# Patient Record
Sex: FEMALE | Race: WHITE
[De-identification: names, ages, dates, MRNs, and addresses within clinical notes are randomized per-mention and may not be internally consistent; named-entity substitution may affect disease eponyms.]

---

## 2017-03-28 NOTE — EPPROC
Electrophysiology Procedure Note: 


ELECTROPHYSIOLOGIC STUDY AND   CATHETER MEDIATED ABLATION OF SLOW/FAST AV NOE 

REENTRY TACHYCARDIA





PROCEDURES PERFORMED:





14148-33    EP evaluation with RA/RV/LA pace/record, with arrhythmia induction





82950-49    EP evaluation with RA/RV pace record, insert/reposition catheter, 

with arrhythmia induction





52546      Intracardiac catheter ablation, SVT arrhythmogenic focus 





49736      3D mapping





Fluoroscopy








INDICATION: SVT on LINQ








PROCEDURE:





Catheters & Anesthesia: 





The patient arrived in the Electrophysiology Laboratory in the fasting state.  

The right clavicular region, right groin, and left groin area were prepped and 

draped in the usual sterile manner.  Anesthesiologist Dr. BRANDIE Perry administered 

general anesthesia.   Appropriate non-invasive blood pressure, pulse oximetry 

and end-tidal CO2 monitoring was established.





All catheters were placed percutaneously using the modified Seldinger technique

, and advanced into position under fluoroscopic guidance. One #6 Guatemalan 

hexapolar non-deflectable electrode catheter was inserted into the right atrial 

appendage via the left femoral vein (2mm spacing; except the proximal ring 

which was 25cm from the tip  used for unipolar recordings).  One #7 Guatemalan 

deflectable octapolar electrode catheter was advanced to the His-bundle 

position via the left femoral vein (2mm spacing).   One #7 Guatemalan deflectable 

catheter with 10 pairs of electrodes was placed via the right femoral vein into 

the coronary sinus. CS access needed to be obtained with quadrapolar catheter 

first.


Heparin was given to keep ACT > 200 s.





Programmed stimulation was performed from the right atrium, right ventricle and 

coronary sinus (left atrium).   Parahisian pacing demonstrated constant H-A 

interval with changing V-A intervals and stimulus-A intervals during capture 

and loss of capture of proximal RBB proving retrograde conduction over AV node.

  Dual AV noe pathway physiology was seen.  Up to 3 bts of AVNRT were induced

, sustained AVNRT could not be induced despite programmed stimulation on 

isoproterenol up to 4 mcg/min.  Sinus tachycardia 380 ms was seen with 

isoproterenol.





Given that patient has had sustained SVT, we decided to ablate the slow AV 

noe pathway.





Mapping of the right atrium and coronary sinus during AVNRT identified earliest 

atrial activation above the tendon of Micah at a level slightly posterior to 

the level of the His bundle, consistent with retrograde conduction over the 

fast AV neo pathway.    





A #8 Guatemalan deflectable quadrapolar electrode catheter (2mm-5mm-2mm spacing) 

with 4 mm tip electrode and sensor for the 3D mapping Carto system was advanced 

to the right atrium. 3 D mapping of the inter-atrial septum and coronary sinus 

was performed and location of the AV node was marked.





 A SL2 sheath was used.  RF applications were delivered to the region between 

the tricuspid annulus   and the coronary sinus ostium, at the level of the 

upper edge of the coronary sinus ostium. Radiofrequency applications were also 

delivered along the roof of the proximal coronary sinus.    Junctional rhythm 

occurred during all of the RF applications.





Antegrade slow pathway conduction was not seen post ablation.





The catheters were removed.  The long sheath was changed to a short 9 Fr 

sheath. The patient was transferred to the cardiovascular holding area in 

stable condition.  Vascular access sheaths will be removed in the holding area.

  There were no apparent complications.





Results:





Spontaneous Intervals:


Pre ablation     ms AH 60 ms HV 40 ms


Post ablation   ms AH 60 ms HV 40 ms





Antegrade AV noe function (decremental pacing)


Pre ablation    FPERP 460 ms SPERP 450 ms WBB  ms


Post ablation    FPERP 360 ms   WBB  ms





Retrograde AV noe function (decremental pacing)


Pre ablation    FPERP 290 ms   WBB  ms 





 Arrhythmias:


Non-Sustained slow/fast AVNRT, 3 bts


 








CONCLUSIONS





1.Prior documented sustained SVT


2.Successful ablation of the slow AV noe pathway with elimination of 1:1 

antegrade conduction over the slow AV noe pathway and all retrograde 

conduction over the slow AV noe pathway. 


3.No complications.








Patient Problems: 


 Problems











Problem Status Onset


 


Supraventricular tachycardia Acute

## 2017-03-28 NOTE — CPEKG
Heart Rate: 77

RR Interval: 779

P-R Interval: 144

QRSD Interval: 82

QT Interval: 392

QTC Interval: 444

P Axis: 77

QRS Axis: 54

T Wave Axis: 56

EKG Severity - OTHERWISE NORMAL ECG -

EKG Impression: SINUS RHYTHM

EKG Impression: Possible left atrial abnormality

EKG Impression: No significant change from March 28, 2017, 7:28

Electronically Signed By: Kwabena Clements 28-Mar-2017 18:32:44

## 2017-03-28 NOTE — CPEKG
Heart Rate: 67

RR Interval: 896

P-R Interval: 144

QRSD Interval: 94

QT Interval: 412

QTC Interval: 435

P Axis: 76

QRS Axis: 61

T Wave Axis: 60

EKG Severity - NORMAL ECG -

EKG Impression: SINUS RHYTHM

EKG Impression: Agree with above

Electronically Signed By: Kwabena Clements 28-Mar-2017 18:33:38

## 2017-03-29 NOTE — CPEKG
Heart Rate: 84

RR Interval: 714

P-R Interval: 132

QRSD Interval: 86

QT Interval: 376

QTC Interval: 445

P Axis: 77

QRS Axis: 58

T Wave Axis: 52

EKG Severity - NORMAL ECG -

EKG Impression: SINUS RHYTHM

EKG Impression: No significant change from March 28, 2017

Electronically Signed By: Kwabena Clements 29-Mar-2017 12:27:21

## 2017-03-29 NOTE — PDCARPN
Cardiology Progress Note


Chief Complaint: 





Patient reports chest pressure with deep inspiration.  Also reporting continues 

left hip pain.


Assessment/Plan: 


Assessment:


46-year-old female patient with significant history of factor 5 Leiden mutation 

and SVT.  Noted February 11th off continues monitoring of an episode SVT with 

heart rates up to 207 BPM.  Referred to Dr. Baltazar for EP study.  Performed 

yesterday, which she was successfully able to eliminate a slow AV belkis pathway 

with retrograde conduction (AVNRT).  No apparent complications.  This morning, 

patient reporting chest pressure with deep inspirations, reporting has been 

going on for greater than 1 week.  And left hip pain.  Groin site without signs 

of bleeding, ecchymosis, hematoma, or signs of infection.  Electrocardiogram 

done this a.m. shows sinus rhythm with no significant ST or T-wave 

abnormalities suggesting of ischemia.  Unchanged from preprocedural EKG.  

Patient noted to have normal exercise treadmill test done at [facility] 

7/8/2016.  Echocardiogram done this morning showed normal LV function with EF 66

%, no wall motion abnormalities, trace MR, no pericardial effusion. Patient 

with mildly elevated troponin this morning at 0.211, which is expected after 

ablation.  No CK-MB elevation.  Patient sent for CTA of chest, negative for PE, 

trace right pleural effusion.  Noted 4 mm nodule, which radiology reports no 

further follow-up is recommended due the patient being a nonsmoker.  Patient 

given ibuprofen, reporting some relief in symptoms.  Toradol this afternoon.  

Continuous cardiac monitoring shows sinus rhythm, no SVT, VT, or malignant 

arrhythmias could.  No significant pauses.





Plan:


1. SVT:  Status post ablation, no arrhythmias noted on continuous cardiac 

monitoring.





2.  CP:  Improved with ibuprofen, patient given torsemide this afternoon.  

Electrocardiogram showing normal LV systolic function, normal EF, no wall 

motion abnormalities.  No pericardial effusion.  No acute ST or T-wave 

abnormalities.  Mild troponin bump as expected post ablation, no CK elevation.  

Negative CTA for PE.  Will plan on monitoring overnight, repeat EKG in a.m..  

Repeat troponin level to assure on downward decline.  Will also set order C 

reactive protein and sed rate in am.





3. Factor 5 Leiden deficiency:  Patient had Lovenox postoperatively, transition 

to Eliquis, per Dr. Baltazar once her on a for a minimum of 1 month post ablation 





4.  4 mm nodule on CTA chest:  Patient with previous history of smoking, quit 

25 years ago, will refer to pulmonology as an outpatient for follow-up.





5.  Left hip pain:  No signs of bleeding (H&H stable), improvement with 

ibuprofen.  Potential muscular skeletal due to duration laying on  EP table.





Due to the patient needing to be further monitor, will plan for her for least 1 

more midnight stay.

















03/29/17 16:19





Subjective: 





Patient denies of any shortness of breath, orthopnea, PND, edema, palpitations, 

near-syncope, or syncopal events.


Reviewed/Discussed With: family (), other (Dr Baltazar)


Objective: 





 Vital Signs (8 Hrs)











  Temp Pulse Resp BP Pulse Ox


 


 03/29/17 11:52  36.6 C  66  17  101/69  99








 Intake/Output (24 Hrs)











 03/28/17 03/29/17 03/30/17





 05:59 05:59 05:59


 


Intake Total  3100 


 


Output Total  850 


 


Balance  2250 


 


Intake:   


 


  Oral (ml)  1250 


 


  IV Intake (ml)  1850 


 


Output:   


 


  Urine (ml)  850 


 


    Toilet  850 


 


Other:   


 


  Weight  58 kg 


 


  Intake Quantity  Yes 





  Sufficient   


 


  Number of Voids   


 


    Toilet  1 











Result Diagrams: 


 03/29/17 03:37





 03/29/17 03:37


Cardiac Labs: 





 Cardiac Lab Results (72 Hrs)











  03/29/17





  03:37


 


CK-MB (CK-2) Fraction  1.27


 


Troponin I  0.211 H














- Physical Exam


Constitutional: WDWN, healthy appearing, no apparent distress


Ears, Nose, Mouth, Throat: moist mucous membranes


Cardiovascular: regular rate and rhythm, no murmurs, no rubs, no gallops, 

pulses symmetric bilat, No systolic murmur, No jugular vein distention, No 

carotid bruit


Peripheral Pulses: 1+: dorsalis-pedis (R), dorsalis-pedis (L), 2+: carotid (R), 

carotid (L)


Respiratory: clear to auscultate bilat, no crackles, no wheezes, No reduced air 

movement


Gastrointestinal: normoactive bowel sounds


Skin: no rashes, warm, no edema, other (Bilateral groin sites, catheter 

insertion sites, with no redness, swelling, or drainage.  No ecchymosis, 

hematoma, or bleeding.  No auscultated bruit over either site.)


Neurologic: AAOx3


Psychiatric: cooperative, interactive, following commands





ICD10 Worksheet


Patient Problems: 


 Problems











Problem Status Onset


 


Supraventricular tachycardia Acute

## 2017-03-29 NOTE — ECHO
[id number]

[id number]



+---------------------------------------------------+      [address]

:                                                   :       [place] [address]

:                                                   :           [phone]

+---------------------------------------------------+       Fax [phone]



                       Adult Echocardiographic Report



+----------------------------------------------------------------------------

---+

:Name: CARLEE NAVARRO LStudy Date: 2017 09:49 AM                          

   :

:MRN: [id number]    Hospital Admission Number: [id number] Location: 

222:

:: 1970    Gender: Female                         Height: 65 in     

   :

:Age: 46 yrs        Race: WH                               Weight: 127 lb    

   :

:Reason For Study: Eval LV Fx                                                

   :

:                                                          BSA: 1.6 meters2  

   :

:History: Post Ablation                                                      

   :

+----------------------------------------------------------------------------

---+

MMode/2D Measurements \T\ Calculations

IVSd: 0.77 cm   LVIDd: 4.3 cm  FS: 35.5 %            Ao root diam: 2.5 cm

LVPWd: 0.94 cm  LVIDs: 2.8 cm  EDV(Teich): 83.0 ml   ACS: 1.7 cm

                               ESV(Teich): 28.8 ml   LA dimension: 2.3 cm

                               EF(Teich): 65.3 %



Normal Measurement Values:

+----------------------------------------------------------------------------

----------------------------------+

:LVIDd (3.5-5.7cm)    IVSd (0.6-1.1cm)     LVPWd (0.6-1.1cm)     Aortic Root 

(2.0-3.7cm)Left Atrium (1.5-4.0cm):

:LV Vol(d) (76-115ml) LV Vol(s) (29-48ml)  Ejec Fraction (50-65%)PV Gomez (0.6-

1.2m/s)    TV Gomez (0.4-1.0m/s)    :

:MV E Gomez (0.8-1.0m/s)MV A Gomez (0.3-1.0m/s)LVOT Gomez (0.7-1.2m/s) Asc Ao Gomez (

0.9-1.8m/s)                       :

+----------------------------------------------------------------------------

----------------------------------+

Doppler Measurements \T\ Calculations

MV E max gomez:       Ao V2 max:          LV V1 max:        PA V2 max:

83.4 cm/sec         139.8 cm/sec        81.9 cm/sec       81.4 cm/sec

MV A max gomez:       Ao max P.8 mmHg LV V1 max PG:     PA max P.8 cm/sec                             2.7 mmHg          2.7 mmHg

MV E/A: 1.5



Left Ventricle

The left ventricle is normal in size. There is normal left ventricular wall

thickness. The left ventricular ejection fraction is normal. Ejection

Fraction = 66%. The left ventricular wall motion is normal.







Atria

The left atrial size is normal. Right atrial size is normal.





Mitral Valve

The mitral valve is normal in structure and function. There is no evidence

of mitral valve prolapse. There is no mitral valve stenosis. There is trace

mitral regurgitation.



Tricuspid Valve

Normal tricuspid valve. No tricuspid regurgitation.



Aortic Valve

The aortic valve is normal in structure and function. The aortic valve is

trileaflet. There is no aortic stenosis. There is no aortic insufficiency.



Pulmonic Valve

The pulmonic valve is normal in structure and function. There is no pulmonic

valvular regurgitation.



Great Vessels

The aortic root is normal size.





Pericardium/Pleural

There is no pericardial effusion.



Conclusion

A complete two-dimensional transthoracic echocardiogram was performed (2D,

M-mode, Doppler and color flow Doppler).

The left ventricular ejection fraction is normal.

Ejection Fraction = 66%.

The left ventricular wall motion is normal.

The left atrial size is normal.

The mitral valve is normal in structure and function.

There is trace mitral regurgitation.

The aortic valve is normal in structure and function.

The aortic valve is trileaflet.

There is no pericardial effusion.



_____________________________________________________________________________







Final Reading Physician:

                        Greta Blancas signed on 2017 10:40 AM

Ordering Physician: Mendoza Fontenot

Performed By: Anthony Brennan, EVERETTCS

## 2017-03-30 NOTE — GDS
[f rep st]



                                                             DISCHARGE SUMMARY





DISCHARGE DIAGNOSES:  

1.  Paroxysmal supraventricular tachycardia confirmed by LINQ monitor placement.

2.  History of factor V Leiden mutation.

3.  History of transient ischemic attacks to vertebral artery dissection.

4.  4 mm right lower lobe nodule.

5.  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



PROCEDURES:  03/28/2017, EP study with successful ablation of the AV belkis pathway with elimination 
of one-to-one antegrade conduction over the slow AV belkis pathway and all retrograde conduction over
 the slow AV belkis pathway.



HISTORY:  Please see dictated H and P from Dr. Baltazar for complete details.  In brief, the patient is a
 46-year-old female with a history of SVT, vertebral artery dissection with subsequent TIAs, factor 
V Leiden mutation, who was referred by Dr. Roland Villa for SVT found on LINQ monitoring.  She proceed
ed to EP study with ablation with Dr. Baltazar.  Yesterday was postprocedure day #1.  Patient was describ
ing about a week's history of shortness of breath and chest pain on deep inspiration.  Echo showed n
o pericardial effusion.  EKG was without evidence of any ST changes.  CTA was obtained and was negat
kristie for PE.  She does have a small 4 mm nodule, and she was advised to follow up with pulmonology in
 regard to this.



HOSPITAL COURSE BY PROBLEM:  

1.  SVT, status post ablation.  Due to her factor V Leiden mutation, she has been started on Eliquis
, which will need to be continued for a month total.  She has been given a prescription as well as a
 free sample card.

2.  Chest pain.  She reports it was improved with Ativan.  She is requesting a short prescription of
 this and this was given to her.

3.  Factor V Leiden mutation.  As above, she is being started on Eliquis postprocedure.  After 1 mon
th, she is to transition back to her oral aspirin.

4.  4 mm lung nodule.  She may see pulmonology as an outpatient for this.

5.  Left hip pain, likely musculoskeletal.  She was advised on analgesia for this.



RESULTS PENDING:  None.



PHYSICAL EXAM:  VITAL SIGNS:  On day of discharge, blood pressure 104/62, heart rate 76, respiration
s 16, O2 saturation 98% on room air, temp of 97.4.  GENERAL:  She is a very pleasant female in no ap
parent distress.  HEENT:  Eyes are without scleral icterus.  HEART:  Regular rate and rhythm.  LUNGS
:  Clear.  ABDOMEN:  Groin site without bruit.  EXTREMITIES:  She has 2+ PT and DP pulses bilaterall
y.



LABORATORY DATA:  CBC on day of discharge, with a WBC of 5.87, hemoglobin 12.5, hematocrit 36.8, josh
telet count of 204.  BMP:  Sodium 138, potassium 4.2, chloride 109, CO2 21, BUN 10, creatinine 0.7, 
glucose 93.



DIET:  Per previous.



ACTIVITY:  Groin precautions were reviewed.



FOLLOWUP:  

1.  Follow up with Dr. Baltazar as scheduled.

2.  Consult pulmonology as an outpatient.

3.  Follow up with PCP as scheduled. 



Please note that greater than 30 minutes was spent on discharge and coordination of care.





Job #:  [id number]

## 2017-03-30 NOTE — CPEKG
Heart Rate: 80

RR Interval: 750

P-R Interval: 132

QRSD Interval: 80

QT Interval: 380

QTC Interval: 439

P Axis: 73

QRS Axis: 56

T Wave Axis: 54

EKG Severity - NORMAL ECG -

EKG Impression: SINUS RHYTHM

EKG Impression: No significant change from March 29, 2017

Electronically Signed By: Kwabena Clements 30-Mar-2017 15:28:32

## 2017-12-06 ENCOUNTER — HOSPITAL ENCOUNTER (OUTPATIENT)
Dept: HOSPITAL 80 - FCATH | Age: 47
Discharge: HOME | End: 2017-12-06
Attending: INTERNAL MEDICINE
Payer: COMMERCIAL

## 2017-12-06 DIAGNOSIS — Z45.09: Primary | ICD-10-CM

## 2017-12-06 PROCEDURE — 0JPT02Z REMOVAL OF MONITORING DEVICE FROM TRUNK SUBCUTANEOUS TISSUE AND FASCIA, OPEN APPROACH: ICD-10-PCS | Performed by: INTERNAL MEDICINE

## 2017-12-06 NOTE — PDGENHP
History & Physical


Chief Complaint: Linq removal


History of Present Illness: 47 year old female with sVT sp ablatiion requesting 

LINQ removal.


Relevant Physical Exam: Awake, Alert appriopriate

## 2017-12-06 NOTE — GPN
[f rep st]



                                                                 PROCEDURE NOTE





DATE OF PROCEDURE:  12/06/2017



PROCEDURE PERFORMED:  [employer] implantable loop recorder removal.



INDICATION:  Initial indication for loop recorder was palpitations and supraventricular tachycardia. 




The patient was found to have a supraventricular tachycardia with an implantable loop recorder and un
derwent successful ablation with Dr. Jair Baltazar.  She is requesting implantable loop recorder removal
 in anticipation of breast augmentation surgery scheduled for sometime next year.



DESCRIPTION OF PROCEDURE:  After informed consent was obtained, the patient was brought to the [facility] catheterization lab where she was prepped and draped in a sterile fashion.  She was given sedation 
with Versed and fentanyl.  Once appropriate level of sedation was achieved, 1% lidocaine was used to 
anesthetize the surgical site locally.  Incision was made with a scalpel blade, and device was remove
d successfully with forceps.  Hemostasis was achieved.  Two staples were applied.  Sterile dressing a
nd Tegaderm were applied.  Patient tolerated the procedure well.  There were no postoperative complic
ations.



CONCLUSION:  Successful implantable loop recorder removal.





Job #:  [id number]

## 2017-12-06 NOTE — PDPROPOC
Sedation Plan of Care


Sedation Plan of Care: vital signs stable, mental status noted, patient 

educated of risks, benefits, alternatives, patient can tolerate sedation


ASA Classification: ASA 2


Planned drugs: fentanyl, midazolam


Mallampati Score: Class 1


Mallampati Reference Image: 





Patient passed 3-3-2 rule?: Yes

## 2018-05-01 ENCOUNTER — HOSPITAL ENCOUNTER (EMERGENCY)
Dept: HOSPITAL 80 - FED | Age: 48
Discharge: HOME | End: 2018-05-01
Payer: COMMERCIAL

## 2018-05-01 VITALS — SYSTOLIC BLOOD PRESSURE: 114 MMHG | DIASTOLIC BLOOD PRESSURE: 74 MMHG

## 2018-05-01 DIAGNOSIS — G43.809: Primary | ICD-10-CM

## 2018-05-01 DIAGNOSIS — Z87.891: ICD-10-CM

## 2018-05-01 LAB
INR PPP: 1.13 (ref 0.83–1.16)
PLATELET # BLD: 196 10^3/UL (ref 150–400)
PROTHROMBIN TIME: 14.7 SEC (ref 12–15)

## 2018-05-01 NOTE — CPEKG
Heart Rate: 82

RR Interval: 732

P-R Interval: 140

QRSD Interval: 78

QT Interval: 384

QTC Interval: 449

P Axis: 73

QRS Axis: 45

T Wave Axis: 40

EKG Severity - NORMAL ECG -

EKG Impression: SINUS RHYTHM

Electronically Signed By: Dominique Lindsay 01-May-2018 15:25:14

## 2018-05-01 NOTE — EDPHY
H & P


Stated Complaint: dizziness started 1700 yesterday, R sided h/a-feels similar 

to vert art dis


Source: Patient, Old records


Exam Limitations: No limitations





- Personal History


LMP (Females 10-55): Hysterectomy





- Medical/Surgical History


Hx Asthma: No


Hx Chronic Respiratory Disease: No


Hx Diabetes: No


Hx Cardiac Disease: No


Hx Renal Disease: No


Hx Cirrhosis: No


Hx Alcoholism: No


Hx HIV/AIDS: No


Hx Splenectomy or Spleen Trauma: No


Other PMH: migraines, "reece" stroke 2014, SVT, hysterxctomy 2012.  verterbral 

artery disection 10/13





- Social History


Smoking Status: Former smoker


Time Seen by Provider: 05/01/18 10:59


HPI/ROS: 


HPI:  This is a 47-year-old female who presents with 





Chief Complaint: dizziness started 1700 yesterday, R sided h/a-feels similar to 

vert art dis





Location:  Right-sided head


Quality:  Achiness and dizziness


Duration:  Acute onset around 5:00 p.m.


Signs and Symptoms: no fever, no nausea, no vomiting, no photophobia, no noise 

sensitivity, no neck stiffness, no ear pain, no tinnitus, no nasal congestion, 

no sinus pressure, no weakness, no radiation, no aura, no rash


Timing:  Acute


Severity:  Improving


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 

still taking aspirin, presents with sudden onset of dizziness described as the 

room spinning and a right temple and right side of head discomfort rated as 5/

10 while at work sitting working on the computer yesterday evening around 5:00 

p.m. Patient reports that she did and upper body workout several days prior and 

had some right triceps tenderness and decreased range of motion.  She reports 

status post her vertebral artery dissection she has always had some dull aching 

discomfort in the right trapezius muscle.  She went to [place] within the 

last 1-2 weeks.  She denies any fever/nasal congestion.  She does have a 

history of migraine headaches.  She reports that her boyfriend came to pick her 

up from work and the headache improved to putting cold packs on the back of her 

neck.  She called her neurologist this morning Dr. Gil, who advised her to go 

to the emergency room immediately to rule out vertebral artery dissection.  

Patient reports that she is hoping this is just muscular strain and migraine 

headache type symptoms.  Patient rates her headache and discomfort as 2/10 

presently and politely declines any medication. 


Modifying Factors:  Aspirin





Comment: 








ROS: see HPI


Constitutional: No fever, no chills, no weight loss


Eyes:  No blurred vision


Respiratory:  No shortness of breath, no cough


Cardiovascular:  No chest pain, no palpitations


Gastrointestinal:  No nausea, no vomiting, no diarrhea, no hematemesis, no 

blood in stool 


Genitourinary:  No dysuria, no blood in urine 


Extremities:  No myalgias, no edema


Neurologic:  No weakness, no numbness


Skin:  No rashes, no petechiae


Hematologic:  No bruising, no bleeding








MEDICAL/SURGICAL/SOCIAL HISTORY: 


Medical.Surgery history:  migraines, "reece" stroke 2014, SVT, hysterectomy 2012


vertebral artery dissection 10/13


Social history:  Employed.  Works on the computer. Family history 

noncontributory.








CONSTITUTIONAL:  Extremely polite and cooperative middle-aged white female, 

awake and alert, no obvious distress


HEENT: Atraumatic and normocephalic, PERRL, EOMI.  Nares patent; no rhinorrhea;

  no nasal mucosal edema. Tympanic membranes clear. Oropharynx clear, no 

exudate and moist pink mucosa.  Airway patent.  No lymphadenopathy.  No 

meningismus.  No carotid bruits.  No temporal reproducible tenderness.


Cardiovascular: Normal S1/S2, regular rate, regular rhythm, without murmur rub 

or gallop.


PULMONARY/CHEST:  Symmetrical and nontender. Clear to auscultation bilaterally. 

Good air movement. No accessory muscle usage.


ABDOMEN:  Soft, nondistended, nontender, no rebound, no guarding, no peritoneal 

signs, no masses or organomegaly. No CVAT.


EXTREMITIES:  2/2 pulses, strength 5/5, no deformities, no clubbing, no 

cyanosis or edema.


NEUROLOGICAL: no focal neuro deficits.  GCS 15. Cranial nerves 2-12 grossly 

intact. 


SKIN: Warm and dry, no erythema. no rash.  Good capillary refill. 


 


 (Mandy Gonzalez)


Constitutional: 


 Initial Vital Signs











Temperature (C)  36.4 C   05/01/18 10:34


 


Heart Rate  79   05/01/18 10:34


 


Respiratory Rate  16   05/01/18 10:34


 


Blood Pressure  105/69   05/01/18 10:34


 


O2 Sat (%)  97   05/01/18 10:34








 











O2 Delivery Mode               Room Air














Allergies/Adverse Reactions: 


 





topiramate [From Topamax] Allergy (Verified 03/28/17 08:54)


 Hives








Home Medications: 














 Medication  Instructions  Recorded


 


Gabapentin  05/01/18


 


Meclizine HCl [Meclizine HCl 25 mg 25 mg PO Q6 #12 tab 05/01/18





(RX,OTC)]  














Medical Decision Making





- Diagnostics


EKG Interpretation: 





12 lead EKG is interpreted in Trace master View by emergency department 

physician. (Dominique Lindsay)


ED Course/Re-evaluation: 


EKG, CT head, CTA head and neck, labs ordered


Vital signs stable upon arrival.  No systemic signs.


EKG shows sinus rhythm with a rate of 84 beats per minute


1150:  Reviewed labs.  No signs of leukocytosis/anemia/FRANCESCO/electrolyte imbalance

/coagulopathy. 


Chart review shows that CT angio neck on 04/11/2017:  No change in focal 

dissection involving 5 to set van mm length of the right vertebral artery at 

the level C6. 


1225:  Called by radiologist who advised that head CT scan, CTA head and neck 

shows stable 5 mm vertebral artery dissection.  No other acute processes. 


1226:  Reassessed patient who reports that room spinning increases with any 

movement.  Given IV Decadron and p.o. Meclizine 25 mg.


1305:  Reassessed patient who reports 50% improvement in symptoms.  Able to sit 

up and stand without excessive dizziness. 


1310:  Spoke with neurologist, Dr. Gil, regarding stable CT and CTA finding.  

She is requesting MRI of her brain to evaluate for cerebellar embolism prior to 

discharge. 


1530:  Called by radiologist who advised that MRI brain is normal in shows no 

acute intracranial processes. 


Reassessed patient who reports that she feels back to baseline.  Denies any 

headache/dizziness.  She reports that she just feels tired.  She will follow up 

with both her neurologist and primary care provider.  Requested a prescription 

for Antivert. 





No signs of CVA/temporal arteritis/cerebellar embolism/meningitis








This patient was seen under the supervision of my secondary supervising 

physician.  I evaluated care for this patient independently.  Discussed this 

patient with Dr. Lindsay who did not see the patient.  








 (Mandy Gonzalez)





The patient was evaluated and managed by the physician assistant.  I have 

reviewed this chart and I agree with the findings and plan of care as documented

, as indicated by my signature.  I am the secondary supervising physician. (

Dominique Lindsay)


Differential Diagnosis: 


Dizziness including but not limited to peripheral and central causes of vertigo

, orthostatic causes including dehydration, and blood loss.


 (Mandy Gonzalez)





- Data Points


Laboratory Results: 


 Laboratory Results





 05/01/18 11:15 





 05/01/18 11:15 








Medications Given: 


 








Discontinued Medications





Dexamethasone (Decadron Injection)  8 mg IVP EDNOW ONE


   Stop: 05/01/18 12:24


   Last Admin: 05/01/18 12:33 Dose:  8 mg


Meclizine HCl (Meclizine Hcl)  25 mg PO EDNOW ONE


   Stop: 05/01/18 12:26


   Last Admin: 05/01/18 12:30 Dose:  25 mg








Departure





- Departure


Disposition: Home, Routine, Self-Care


Clinical Impression: 


 Vertiginous migraine





Condition: Good


Instructions:  Migraine Headache (ED), Vertigo (ED)


Additional Instructions: 


Consume a minimum of 8-10 glasses of water or electrolyte fluid replacement 

drinks that include Gatorade, Powerade, Pedialyte. 


Follow-up with both your primary care provider and neurologist within the next 3

-5 days. 


Take meclizine 1 tab every 6 hours as needed for vertigo. 








Return to the ER immediately if you have progressive headaches, neurologic 

deficits, gait abnormality, visual disturbance, slurred speech, or any other 

symptom that concerns you. 





Referrals: 


Yvrose Jaeger MD [Primary Care Provider] - As per Instructions


Dasha Gil DO [Non Staff and Non MD] - As per Instructions


Prescriptions: 


Meclizine HCl [Meclizine HCl 25 mg (RX,OTC)] 25 mg PO Q6 #12 tab